# Patient Record
Sex: MALE | Race: BLACK OR AFRICAN AMERICAN | NOT HISPANIC OR LATINO | Employment: OTHER | URBAN - METROPOLITAN AREA
[De-identification: names, ages, dates, MRNs, and addresses within clinical notes are randomized per-mention and may not be internally consistent; named-entity substitution may affect disease eponyms.]

---

## 2023-09-13 PROBLEM — G89.29 OTHER CHRONIC PAIN: Status: ACTIVE | Noted: 2023-09-13

## 2023-09-13 PROBLEM — I10 HTN (HYPERTENSION): Status: ACTIVE | Noted: 2023-09-13

## 2023-09-13 PROBLEM — E83.52 SERUM CALCIUM ELEVATED: Status: ACTIVE | Noted: 2023-09-13

## 2023-09-13 PROBLEM — G47.00 INSOMNIA: Status: ACTIVE | Noted: 2023-09-13

## 2023-09-13 PROBLEM — N40.1 BENIGN PROSTATIC HYPERPLASIA WITH LOWER URINARY TRACT SYMPTOMS: Status: ACTIVE | Noted: 2023-09-13

## 2023-09-13 PROBLEM — M75.102 ROTATOR CUFF SYNDROME OF LEFT SHOULDER: Status: ACTIVE | Noted: 2023-09-13

## 2023-09-13 PROBLEM — M54.41 LUMBAGO WITH SCIATICA, RIGHT SIDE: Status: ACTIVE | Noted: 2023-09-13

## 2023-09-13 RX ORDER — NAPROXEN 500 MG/1
500 TABLET ORAL EVERY 12 HOURS PRN
COMMUNITY
End: 2024-04-04 | Stop reason: SDUPTHER

## 2023-09-13 RX ORDER — IBUPROFEN 800 MG/1
800 TABLET ORAL 3 TIMES DAILY PRN
COMMUNITY

## 2023-09-13 RX ORDER — TAMSULOSIN HYDROCHLORIDE 0.4 MG/1
0.4 CAPSULE ORAL DAILY
COMMUNITY

## 2023-09-13 RX ORDER — ESZOPICLONE 3 MG/1
3 TABLET, FILM COATED ORAL NIGHTLY
COMMUNITY
Start: 2023-01-05 | End: 2024-02-07 | Stop reason: SDUPTHER

## 2023-09-13 RX ORDER — AMLODIPINE BESYLATE 5 MG/1
5 TABLET ORAL DAILY
COMMUNITY
End: 2024-03-18 | Stop reason: SDUPTHER

## 2023-09-29 ENCOUNTER — APPOINTMENT (OUTPATIENT)
Dept: PRIMARY CARE | Facility: CLINIC | Age: 63
End: 2023-09-29

## 2023-09-29 ENCOUNTER — HOSPITAL ENCOUNTER (OUTPATIENT)
Dept: DATA CONVERSION | Facility: HOSPITAL | Age: 63
Discharge: HOME | End: 2023-09-29
Payer: COMMERCIAL

## 2023-09-29 LAB
AMPHETAMINES UR QL SCN>1000 NG/ML: NEGATIVE
BARBITURATES UR QL SCN>300 NG/ML: NEGATIVE
BENZODIAZ UR QL SCN>300 NG/ML: NEGATIVE
BZE UR QL SCN>300 NG/ML: NEGATIVE
CANNABINOIDS UR QL SCN>50 NG/ML: NEGATIVE
DRUG SCREEN COMMENT UR-IMP: NORMAL
FENTANYL+NORFENTANYL UR QL SCN: NEGATIVE
METHADONE UR QL SCN>300 NG/ML: NEGATIVE
OPIATES UR QL SCN>300 NG/ML: NEGATIVE
OXYCODONE UR QL: NEGATIVE
PCP UR QL SCN>25 NG/ML: NEGATIVE

## 2024-02-07 DIAGNOSIS — G47.00 INSOMNIA, UNSPECIFIED TYPE: Primary | ICD-10-CM

## 2024-02-07 RX ORDER — ESZOPICLONE 3 MG/1
3 TABLET, FILM COATED ORAL NIGHTLY
Qty: 90 TABLET | Refills: 0 | Status: SHIPPED | OUTPATIENT
Start: 2024-02-07 | End: 2024-04-04 | Stop reason: SDUPTHER

## 2024-03-18 DIAGNOSIS — I10 PRIMARY HYPERTENSION: Primary | ICD-10-CM

## 2024-03-18 RX ORDER — AMLODIPINE BESYLATE 5 MG/1
5 TABLET ORAL DAILY
Qty: 90 TABLET | Refills: 0 | Status: SHIPPED | OUTPATIENT
Start: 2024-03-18

## 2024-04-01 ENCOUNTER — LAB (OUTPATIENT)
Dept: LAB | Facility: LAB | Age: 64
End: 2024-04-01
Payer: COMMERCIAL

## 2024-04-01 DIAGNOSIS — Z13.6 ENCOUNTER FOR SCREENING FOR CARDIOVASCULAR DISORDERS: ICD-10-CM

## 2024-04-01 DIAGNOSIS — Z12.5 ENCOUNTER FOR SCREENING FOR MALIGNANT NEOPLASM OF PROSTATE: ICD-10-CM

## 2024-04-01 DIAGNOSIS — I10 ESSENTIAL (PRIMARY) HYPERTENSION: ICD-10-CM

## 2024-04-01 DIAGNOSIS — Z00.00 ENCOUNTER FOR GENERAL ADULT MEDICAL EXAMINATION WITHOUT ABNORMAL FINDINGS: Primary | ICD-10-CM

## 2024-04-01 LAB
ALBUMIN SERPL BCP-MCNC: 4.2 G/DL (ref 3.4–5)
ALP SERPL-CCNC: 83 U/L (ref 33–136)
ALT SERPL W P-5'-P-CCNC: 16 U/L (ref 10–52)
ANION GAP SERPL CALC-SCNC: 12 MMOL/L (ref 10–20)
AST SERPL W P-5'-P-CCNC: 21 U/L (ref 9–39)
BASOPHILS # BLD AUTO: 0.04 X10*3/UL (ref 0–0.1)
BASOPHILS NFR BLD AUTO: 0.7 %
BILIRUB SERPL-MCNC: 0.4 MG/DL (ref 0–1.2)
BUN SERPL-MCNC: 14 MG/DL (ref 6–23)
CALCIUM SERPL-MCNC: 9.5 MG/DL (ref 8.6–10.6)
CHLORIDE SERPL-SCNC: 106 MMOL/L (ref 98–107)
CHOLEST SERPL-MCNC: 186 MG/DL (ref 0–199)
CHOLESTEROL/HDL RATIO: 4.3
CO2 SERPL-SCNC: 30 MMOL/L (ref 21–32)
CREAT SERPL-MCNC: 1.21 MG/DL (ref 0.5–1.3)
EGFRCR SERPLBLD CKD-EPI 2021: 67 ML/MIN/1.73M*2
EOSINOPHIL # BLD AUTO: 0.13 X10*3/UL (ref 0–0.7)
EOSINOPHIL NFR BLD AUTO: 2.3 %
ERYTHROCYTE [DISTWIDTH] IN BLOOD BY AUTOMATED COUNT: 14.3 % (ref 11.5–14.5)
GLUCOSE SERPL-MCNC: 86 MG/DL (ref 74–99)
HCT VFR BLD AUTO: 47.1 % (ref 41–52)
HDLC SERPL-MCNC: 43.7 MG/DL
HGB BLD-MCNC: 14.6 G/DL (ref 13.5–17.5)
IMM GRANULOCYTES # BLD AUTO: 0.01 X10*3/UL (ref 0–0.7)
IMM GRANULOCYTES NFR BLD AUTO: 0.2 % (ref 0–0.9)
LDLC SERPL CALC-MCNC: 124 MG/DL
LYMPHOCYTES # BLD AUTO: 1.64 X10*3/UL (ref 1.2–4.8)
LYMPHOCYTES NFR BLD AUTO: 28.6 %
MCH RBC QN AUTO: 27.8 PG (ref 26–34)
MCHC RBC AUTO-ENTMCNC: 31 G/DL (ref 32–36)
MCV RBC AUTO: 90 FL (ref 80–100)
MONOCYTES # BLD AUTO: 0.42 X10*3/UL (ref 0.1–1)
MONOCYTES NFR BLD AUTO: 7.3 %
NEUTROPHILS # BLD AUTO: 3.5 X10*3/UL (ref 1.2–7.7)
NEUTROPHILS NFR BLD AUTO: 60.9 %
NON HDL CHOLESTEROL: 142 MG/DL (ref 0–149)
NRBC BLD-RTO: 0 /100 WBCS (ref 0–0)
PLATELET # BLD AUTO: 294 X10*3/UL (ref 150–450)
POTASSIUM SERPL-SCNC: 4.5 MMOL/L (ref 3.5–5.3)
PROT SERPL-MCNC: 7 G/DL (ref 6.4–8.2)
PSA SERPL-MCNC: 6.78 NG/ML
RBC # BLD AUTO: 5.26 X10*6/UL (ref 4.5–5.9)
SODIUM SERPL-SCNC: 143 MMOL/L (ref 136–145)
TRIGL SERPL-MCNC: 91 MG/DL (ref 0–149)
VLDL: 18 MG/DL (ref 0–40)
WBC # BLD AUTO: 5.7 X10*3/UL (ref 4.4–11.3)

## 2024-04-01 PROCEDURE — 80061 LIPID PANEL: CPT

## 2024-04-01 PROCEDURE — 36415 COLL VENOUS BLD VENIPUNCTURE: CPT

## 2024-04-01 PROCEDURE — 80053 COMPREHEN METABOLIC PANEL: CPT

## 2024-04-01 PROCEDURE — 85025 COMPLETE CBC W/AUTO DIFF WBC: CPT

## 2024-04-01 PROCEDURE — 84153 ASSAY OF PSA TOTAL: CPT

## 2024-04-04 ENCOUNTER — OFFICE VISIT (OUTPATIENT)
Dept: PRIMARY CARE | Facility: CLINIC | Age: 64
End: 2024-04-04
Payer: COMMERCIAL

## 2024-04-04 VITALS
BODY MASS INDEX: 32.44 KG/M2 | DIASTOLIC BLOOD PRESSURE: 84 MMHG | TEMPERATURE: 98 F | HEART RATE: 84 BPM | WEIGHT: 219 LBS | SYSTOLIC BLOOD PRESSURE: 122 MMHG | OXYGEN SATURATION: 95 % | HEIGHT: 69 IN

## 2024-04-04 DIAGNOSIS — Z12.11 SCREENING FOR COLON CANCER: ICD-10-CM

## 2024-04-04 DIAGNOSIS — G47.00 INSOMNIA, UNSPECIFIED TYPE: ICD-10-CM

## 2024-04-04 DIAGNOSIS — M54.16 LUMBAR RADICULOPATHY: Primary | ICD-10-CM

## 2024-04-04 PROCEDURE — 1036F TOBACCO NON-USER: CPT | Performed by: INTERNAL MEDICINE

## 2024-04-04 PROCEDURE — 3079F DIAST BP 80-89 MM HG: CPT | Performed by: INTERNAL MEDICINE

## 2024-04-04 PROCEDURE — 3074F SYST BP LT 130 MM HG: CPT | Performed by: INTERNAL MEDICINE

## 2024-04-04 PROCEDURE — 99214 OFFICE O/P EST MOD 30 MIN: CPT | Performed by: INTERNAL MEDICINE

## 2024-04-04 RX ORDER — NAPROXEN 500 MG/1
500 TABLET ORAL EVERY 12 HOURS PRN
Qty: 90 TABLET | Refills: 1 | Status: SHIPPED | OUTPATIENT
Start: 2024-04-04

## 2024-04-04 RX ORDER — ESZOPICLONE 3 MG/1
3 TABLET, FILM COATED ORAL NIGHTLY
Qty: 90 TABLET | Refills: 0 | Status: SHIPPED | OUTPATIENT
Start: 2024-04-04 | End: 2024-07-03

## 2024-04-04 ASSESSMENT — ENCOUNTER SYMPTOMS
DEPRESSION: 0
COUGH: 0
SHORTNESS OF BREATH: 0
UNEXPECTED WEIGHT CHANGE: 0
BACK PAIN: 1
FATIGUE: 0
ABDOMINAL PAIN: 0
HEMATURIA: 0
TREMORS: 0
LOSS OF SENSATION IN FEET: 0
NUMBNESS: 0
JOINT SWELLING: 0
ARTHRALGIAS: 0
WHEEZING: 0
PALPITATIONS: 0
NERVOUS/ANXIOUS: 0
CONSTIPATION: 0
POLYPHAGIA: 0
SINUS PAIN: 0
OCCASIONAL FEELINGS OF UNSTEADINESS: 0
SORE THROAT: 0
BLOOD IN STOOL: 0
WEAKNESS: 1
POLYDIPSIA: 0

## 2024-04-04 ASSESSMENT — PAIN SCALES - GENERAL: PAINLEVEL: 0-NO PAIN

## 2024-04-04 ASSESSMENT — PATIENT HEALTH QUESTIONNAIRE - PHQ9
1. LITTLE INTEREST OR PLEASURE IN DOING THINGS: NOT AT ALL
2. FEELING DOWN, DEPRESSED OR HOPELESS: NOT AT ALL
SUM OF ALL RESPONSES TO PHQ9 QUESTIONS 1 AND 2: 0

## 2024-04-04 ASSESSMENT — COLUMBIA-SUICIDE SEVERITY RATING SCALE - C-SSRS
6. HAVE YOU EVER DONE ANYTHING, STARTED TO DO ANYTHING, OR PREPARED TO DO ANYTHING TO END YOUR LIFE?: NO
1. IN THE PAST MONTH, HAVE YOU WISHED YOU WERE DEAD OR WISHED YOU COULD GO TO SLEEP AND NOT WAKE UP?: NO
2. HAVE YOU ACTUALLY HAD ANY THOUGHTS OF KILLING YOURSELF?: NO

## 2024-04-04 NOTE — PROGRESS NOTES
Subjective   Patient ID: Abiel Sorensen is a 63 y.o. male who presents for Follow-up (Sciatica pain over right buttocks causing right leg weakness 3 months).    HPI          Has history of hypertension, BPH, insomnia.          Last colonoscopy was in 2014 March by Dr Luna.         H/O BPH- sees Dr Moon.         H/O Hypertension, compliant with medications.     Complaining of right gluteal pain radiating down right leg, with right leg weakness since last 3 months. He is doing exercises at home, which helped a little. Taking Naproxen helps.  Had MRI of lumbar spine in 2020 which showed      Grade 1 anterolisthesis of the L5 5 in relation to the L4 and S1  vertebra. Degenerative changes and disc disease. Left paracentral disc  herniation at the L3-L4 level, contacting the traversing left L4 nerve root  within the lateral recess. Bilateral L5 foraminal stenosis, with impingement  of the exiting L5 nerve roots bilaterally.     He saw [facility] for few months, which helped his symptoms several years ago      Review of Systems   Constitutional:  Negative for fatigue and unexpected weight change.   HENT:  Negative for congestion, ear pain, sinus pain and sore throat.    Respiratory:  Negative for cough, shortness of breath and wheezing.    Cardiovascular:  Negative for chest pain, palpitations and leg swelling.   Gastrointestinal:  Negative for abdominal pain, blood in stool and constipation.   Endocrine: Negative for polydipsia, polyphagia and polyuria.   Genitourinary:  Negative for hematuria and urgency.   Musculoskeletal:  Positive for back pain. Negative for arthralgias and joint swelling.   Skin:  Negative for rash.   Neurological:  Positive for weakness (right leg weakness). Negative for tremors, syncope and numbness.   Psychiatric/Behavioral:  Negative for behavioral problems. The patient is not nervous/anxious.        Objective   /84 (BP Location: Left arm, Patient Position: Sitting, BP Cuff  "Size: Large adult)   Pulse 84   Temp 36.7 °C (98 °F) (Temporal)   Ht 1.753 m (5' 9\")   Wt 99.3 kg (219 lb)   SpO2 95%   BMI 32.34 kg/m²     Physical Exam  Constitutional:       General: He is not in acute distress.  HENT:      Head: Normocephalic and atraumatic.   Eyes:      Extraocular Movements: Extraocular movements intact.      Conjunctiva/sclera: Conjunctivae normal.      Pupils: Pupils are equal, round, and reactive to light.   Cardiovascular:      Rate and Rhythm: Normal rate and regular rhythm.      Pulses: Normal pulses.      Heart sounds: No murmur heard.  Pulmonary:      Effort: Pulmonary effort is normal.      Breath sounds: Normal breath sounds. No wheezing or rales.   Abdominal:      General: Bowel sounds are normal.      Palpations: Abdomen is soft. There is no mass.      Tenderness: There is no abdominal tenderness. There is no guarding.   Musculoskeletal:         General: Normal range of motion.      Right lower leg: No edema.      Left lower leg: No edema.      Comments: Right perilumbar tenderness on palpation, SLR negative, strength in bilateral lower extremities 5 x 5   Skin:     General: Skin is warm and dry.      Findings: No lesion.   Neurological:      General: No focal deficit present.      Mental Status: He is alert and oriented to person, place, and time.      Cranial Nerves: No cranial nerve deficit.      Gait: Gait normal.   Psychiatric:         Mood and Affect: Mood normal.         Assessment/Plan       Abiel was seen today for follow-up.  Diagnoses and all orders for this visit:  Lumbar radiculopathy (Primary)  -     naproxen (Naprosyn) 500 mg tablet; Take 1 tablet (500 mg) by mouth every 12 hours if needed for mild pain (1 - 3). With food or milk  -     Referral to Pain Medicine; Future  -     MR lumbar spine wo IV contrast; Future  Insomnia, unspecified type  -     eszopiclone (Lunesta) 3 mg tablet; Take 1 tablet (3 mg) by mouth once daily at bedtime. immediately before " bedtime  Screening for colon cancer  -     Referral to Gastroenterology; Future     Reviewed recent labs with patient  PSA elevated, has been following with urology  Advised to take naproxen with food  Ordered MRI lumbar spine     Lab Results   Component Value Date    GLUCOSE 86 04/01/2024    CALCIUM 9.5 04/01/2024     04/01/2024    K 4.5 04/01/2024    CO2 30 04/01/2024     04/01/2024    BUN 14 04/01/2024    CREATININE 1.21 04/01/2024        Lab Results   Component Value Date    WBC 5.7 04/01/2024    HGB 14.6 04/01/2024    HCT 47.1 04/01/2024    MCV 90 04/01/2024     04/01/2024    .  Lab Results   Component Value Date    PSA 3.8 05/10/2023    PSA 8.9 (H) 10/06/2022    PSA 4.7 (H) 02/08/2021        Current Outpatient Medications   Medication Instructions    amLODIPine (NORVASC) 5 mg, oral, Daily    eszopiclone (LUNESTA) 3 mg, oral, Nightly, immediately before bedtime    ibuprofen 800 mg, oral, 3 times daily PRN    naproxen (NAPROSYN) 500 mg, oral, Every 12 hours PRN, With food or milk    tamsulosin (FLOMAX) 0.4 mg, oral, Daily

## 2024-05-07 ENCOUNTER — APPOINTMENT (OUTPATIENT)
Dept: PAIN MEDICINE | Facility: CLINIC | Age: 64
End: 2024-05-07
Payer: COMMERCIAL

## 2024-05-10 ENCOUNTER — OFFICE VISIT (OUTPATIENT)
Dept: PAIN MEDICINE | Facility: CLINIC | Age: 64
End: 2024-05-10
Payer: COMMERCIAL

## 2024-05-10 VITALS
HEIGHT: 69 IN | HEART RATE: 72 BPM | RESPIRATION RATE: 18 BRPM | DIASTOLIC BLOOD PRESSURE: 79 MMHG | BODY MASS INDEX: 32.44 KG/M2 | WEIGHT: 219 LBS | SYSTOLIC BLOOD PRESSURE: 113 MMHG

## 2024-05-10 DIAGNOSIS — M79.18 DIFFUSE MYOFASCIAL PAIN SYNDROME: ICD-10-CM

## 2024-05-10 DIAGNOSIS — G89.29 CHRONIC LOW BACK PAIN WITH RIGHT-SIDED SCIATICA, UNSPECIFIED BACK PAIN LATERALITY: ICD-10-CM

## 2024-05-10 DIAGNOSIS — M54.41 CHRONIC LOW BACK PAIN WITH RIGHT-SIDED SCIATICA, UNSPECIFIED BACK PAIN LATERALITY: ICD-10-CM

## 2024-05-10 DIAGNOSIS — M54.16 LUMBAR RADICULOPATHY: Primary | ICD-10-CM

## 2024-05-10 PROCEDURE — 99204 OFFICE O/P NEW MOD 45 MIN: CPT | Performed by: ANESTHESIOLOGY

## 2024-05-10 PROCEDURE — 3074F SYST BP LT 130 MM HG: CPT | Performed by: ANESTHESIOLOGY

## 2024-05-10 PROCEDURE — 3078F DIAST BP <80 MM HG: CPT | Performed by: ANESTHESIOLOGY

## 2024-05-10 PROCEDURE — 99214 OFFICE O/P EST MOD 30 MIN: CPT | Performed by: ANESTHESIOLOGY

## 2024-05-10 RX ORDER — TIZANIDINE 2 MG/1
2 TABLET ORAL 3 TIMES DAILY PRN
Qty: 90 TABLET | Refills: 0 | Status: SHIPPED | OUTPATIENT
Start: 2024-05-10 | End: 2024-06-06

## 2024-05-10 RX ORDER — METHYLPREDNISOLONE 4 MG/1
TABLET ORAL
Qty: 21 TABLET | Refills: 0 | Status: SHIPPED | OUTPATIENT
Start: 2024-05-10 | End: 2024-05-17

## 2024-05-10 ASSESSMENT — ENCOUNTER SYMPTOMS
PSYCHIATRIC NEGATIVE: 1
RESPIRATORY NEGATIVE: 1
HEMATOLOGIC/LYMPHATIC NEGATIVE: 1
BACK PAIN: 1
ENDOCRINE NEGATIVE: 1
NEUROLOGICAL NEGATIVE: 1
CONSTITUTIONAL NEGATIVE: 1
EYES NEGATIVE: 1
CARDIOVASCULAR NEGATIVE: 1
GASTROINTESTINAL NEGATIVE: 1

## 2024-05-10 ASSESSMENT — PATIENT HEALTH QUESTIONNAIRE - PHQ9
SUM OF ALL RESPONSES TO PHQ9 QUESTIONS 1 AND 2: 2
SUM OF ALL RESPONSES TO PHQ9 QUESTIONS 1 AND 2: 0
2. FEELING DOWN, DEPRESSED OR HOPELESS: NOT AT ALL
1. LITTLE INTEREST OR PLEASURE IN DOING THINGS: SEVERAL DAYS
10. IF YOU CHECKED OFF ANY PROBLEMS, HOW DIFFICULT HAVE THESE PROBLEMS MADE IT FOR YOU TO DO YOUR WORK, TAKE CARE OF THINGS AT HOME, OR GET ALONG WITH OTHER PEOPLE: SOMEWHAT DIFFICULT
1. LITTLE INTEREST OR PLEASURE IN DOING THINGS: NOT AT ALL
2. FEELING DOWN, DEPRESSED OR HOPELESS: SEVERAL DAYS

## 2024-05-10 ASSESSMENT — PAIN DESCRIPTION - DESCRIPTORS: DESCRIPTORS: ACHING;NUMBNESS;BURNING

## 2024-05-10 ASSESSMENT — PAIN - FUNCTIONAL ASSESSMENT: PAIN_FUNCTIONAL_ASSESSMENT: 0-10

## 2024-05-10 ASSESSMENT — PAIN SCALES - GENERAL
PAINLEVEL: 3
PAINLEVEL_OUTOF10: 3

## 2024-05-10 ASSESSMENT — LIFESTYLE VARIABLES: TOTAL SCORE: 0

## 2024-05-10 NOTE — LETTER
May 13, 2024     Val Knutson MD  [id number]  [facility], [address]  [facility] [address]    Patient: Abiel Sorensen   YOB: 1960   Date of Visit: 5/10/2024       Dear Dr. Val Knutson MD:    Thank you for referring Abiel Sorensen to me for evaluation. Below are my notes for this consultation.  If you have questions, please do not hesitate to call me. I look forward to following your patient along with you.       Sincerely,     Vik Eugene MD      CC: No Recipients  ______________________________________________________________________________________    PAIN MANAGEMENT NEW PATIENT OFFICE NOTE    Date of Service: 5/10/2024    SUBJECTIVE    CHIEF COMPLAINT: LBP    HISTORY OF PRESENT ILLNESS    Abiel Sorensen is a 63 y.o. male with PMH HTN, obesity who presents as new patient referred by Val Knutson MD with LB pain.    Pt describes ~15 y hx LBP. Pain significantly worsened in last 6 mo without inciting trauma/incident. LBP radiates down RLE and is assoc with R foot numbness, shira in big toe and lateral foot. RLE weaker compared to left. Claims pain is sporadic and troubles him with sitting, standing, walking, and even at night when trying to sleep. Pt has tried Tylenol, NSAIDs, >6 home exercise tx (has done formal PT in past).     Pt denies new-onset bowel/bladder incontinence.  Pt denies recent infection, allergy to Latex/iodine/contrast. Patient is currently taking the following blood thinner(s): N/A    REVIEW OF SYSTEMS  Review of Systems   Constitutional: Negative.    HENT: Negative.     Eyes: Negative.    Respiratory: Negative.     Cardiovascular: Negative.    Gastrointestinal: Negative.    Endocrine: Negative.    Musculoskeletal:  Positive for back pain.   Skin: Negative.    Neurological: Negative.    Hematological: Negative.    Psychiatric/Behavioral: Negative.         PAST MEDICAL HISTORY  History reviewed. No pertinent past medical history.  History  "reviewed. No pertinent surgical history.  Family History   Problem Relation Name Age of Onset   • Mental illness Mother  86   • Dementia Mother     • Stroke Father         CURRENT MEDICATIONS  Current Outpatient Medications   Medication Sig Dispense Refill   • amLODIPine (Norvasc) 5 mg tablet Take 1 tablet (5 mg) by mouth once daily. 90 tablet 0   • eszopiclone (Lunesta) 3 mg tablet Take 1 tablet (3 mg) by mouth once daily at bedtime. immediately before bedtime 90 tablet 0   • ibuprofen 800 mg tablet Take 1 tablet (800 mg) by mouth 3 times a day as needed.     • naproxen (Naprosyn) 500 mg tablet Take 1 tablet (500 mg) by mouth every 12 hours if needed for mild pain (1 - 3). With food or milk 90 tablet 1   • tamsulosin (Flomax) 0.4 mg 24 hr capsule Take 1 capsule (0.4 mg) by mouth once daily.       No current facility-administered medications for this visit.       ALLERGIES AND DRUG REACTIONS  No Known Allergies       OBJECTIVE  Visit Vitals  /79   Pulse 72   Resp 18   Ht 1.753 m (5' 9\")   Wt 99.3 kg (219 lb)   BMI 32.34 kg/m²   Smoking Status Never   BSA 2.2 m²       Last Recorded Pain Score (if available):                Physical Exam  Vitals and nursing note reviewed.     General: Sitting in chair, NAD  Head: NCAT  Eyes: Sclera/conjunctiva clear, EOMI, PERRL  Nose/mouth: MMM  CV: Good distal pulses  Lungs: Good/equal chest excursion  Abdomen: Soft, ND  Ext: No cyanosis/edema  MSK: L-spine alignment: unremarkable, R paraspinal m TTP, L-spine ROM: lmtd extension/flexion 2/2 pain    Neuro: AAOx3   Dermatome sensation to light touch  LEFT L1 (lower pelvis/upper thigh): WNL    RIGHT L1: WNL      LEFT L2 (upper thigh): WNL       RIGHT: L2:WNL      LEFT L3 (medial knee): WNL       RIGHT L3: WNL      LEFT L4 (superior medial malleolus): WNL       RIGHT L4: WNL      LEFT L5 (dorsal foot): WNL       RIGHT L5: WNL      LEFT S1 (lateral foot): WNL     RIGHT S1: WNL      LEFT S2 (popliteal fossa): WNL    RIGHT S2: WNL    " "    Motor strength  LEFT L2 (hip flexion): 5/5   RIGHT L2: 5/5  LEFT L3 (knee extension): 5/5     RIGHT L3: 5/5  LEFT L4 (dorsiflexion): 5/5     RIGHT L4: 5/5  LEFT L5 (EHL extension): 5/5     RIGHT L5: 5/5  LEFT S1 (plantarflexion): 5/5     RIGHT S1: 5/5  LEFT S2 (knee flexion): 5/5      RIGHT S2: 5/5    Special testing  DTR unremarkable  Seated slump test neg BL  Clonus: neg BL  Babinski: neg BL    Psych: affect nl  Skin: no rash/lesions      REVIEW OF LABORATORY DATA  I have reviewed the following lab results:  WBC   Date Value Ref Range Status   04/01/2024 5.7 4.4 - 11.3 x10*3/uL Final     RBC   Date Value Ref Range Status   04/01/2024 5.26 4.50 - 5.90 x10*6/uL Final     Hemoglobin   Date Value Ref Range Status   04/01/2024 14.6 13.5 - 17.5 g/dL Final     Hematocrit   Date Value Ref Range Status   04/01/2024 47.1 41.0 - 52.0 % Final     MCV   Date Value Ref Range Status   04/01/2024 90 80 - 100 fL Final     MCH   Date Value Ref Range Status   04/01/2024 27.8 26.0 - 34.0 pg Final     MCHC   Date Value Ref Range Status   04/01/2024 31.0 (L) 32.0 - 36.0 g/dL Final     RDW   Date Value Ref Range Status   04/01/2024 14.3 11.5 - 14.5 % Final     Platelets   Date Value Ref Range Status   04/01/2024 294 150 - 450 x10*3/uL Final     MPV   Date Value Ref Range Status   10/06/2022 9.3 7.0 - 12.6 CU Final     Sodium   Date Value Ref Range Status   04/01/2024 143 136 - 145 mmol/L Final     Potassium   Date Value Ref Range Status   04/01/2024 4.5 3.5 - 5.3 mmol/L Final     Bicarbonate   Date Value Ref Range Status   04/01/2024 30 21 - 32 mmol/L Final     Urea Nitrogen   Date Value Ref Range Status   04/01/2024 14 6 - 23 mg/dL Final     Calcium   Date Value Ref Range Status   04/01/2024 9.5 8.6 - 10.6 mg/dL Final     No results found for: \"PROTIME\", \"PTT\", \"INR\", \"FIBRINOGEN\"      REVIEW OF RADIOLOGY   I have reviewed the following:  Radiology Studies           MRI L-spine 2/22/20:  Grade 1 anterolisthesis of the L5 5 in " relation to the L4 and S1  vertebra. Degenerative changes and disc disease. Left paracentral disc  herniation at the L3-L4 level, contacting the traversing left L4 nerve root  within the lateral recess. Bilateral L5 foraminal stenosis, with impingement  of the exiting L5 nerve roots bilaterally. See above for details.       ASSESSMENT & PLAN  Abiel Sorensen is a 63 y.o. old male with PMH HTN, obesity who presents as new patient referred by Val Knutson MD with LBP    1) LBP  ->15 y LBP worsening in last >6 mo with radiating down RLE assoc with R foot numbness  -Refractive to yrs of conservative tx including Tylenol, NSAIDs, >6 home exercise tx (has done formal PT in past)  -MRI L-spine pending patient to schedule. Will follow-up  -Submit PA for TPI  -F/U 2 w to discuss MRI results and next steps  -In meantime, MPD, tizanidine PRN        Discussed procedure risks/benefits in detail with patient. Pt meets medical necessity for procedure due to failure of conservative measures. Reviewed procedural risks including bleeding, infection, nerve damage, paralysis. Also reviewed mitigating factors such as screening for infection/blood thinner use, sterile precautions, and image-guidance when applicable. All questions answered. Pt/guardian expressed understanding and choose to proceed           Vik Eugene MD  Anesthesiologist & Interventional Pain Physician   [facility] [place]  O: [phone]  F: [phone]  11:53 AM  05/10/24

## 2024-05-10 NOTE — PROGRESS NOTES
PAIN MANAGEMENT NEW PATIENT OFFICE NOTE    Date of Service: 5/10/2024    SUBJECTIVE    CHIEF COMPLAINT: LBP    HISTORY OF PRESENT ILLNESS    Abiel Sorensen is a 63 y.o. male with PMH HTN, obesity who presents as new patient referred by Val Knutson MD with LB pain.    Pt describes ~15 y hx LBP. Pain significantly worsened in last 6 mo without inciting trauma/incident. LBP radiates down RLE and is assoc with R foot numbness, shira in big toe and lateral foot. RLE weaker compared to left. Claims pain is sporadic and troubles him with sitting, standing, walking, and even at night when trying to sleep. Pt has tried Tylenol, NSAIDs, >6 home exercise tx (has done formal PT in past).     Pt denies new-onset bowel/bladder incontinence.  Pt denies recent infection, allergy to Latex/iodine/contrast. Patient is currently taking the following blood thinner(s): N/A    REVIEW OF SYSTEMS  Review of Systems   Constitutional: Negative.    HENT: Negative.     Eyes: Negative.    Respiratory: Negative.     Cardiovascular: Negative.    Gastrointestinal: Negative.    Endocrine: Negative.    Musculoskeletal:  Positive for back pain.   Skin: Negative.    Neurological: Negative.    Hematological: Negative.    Psychiatric/Behavioral: Negative.         PAST MEDICAL HISTORY  History reviewed. No pertinent past medical history.  History reviewed. No pertinent surgical history.  Family History   Problem Relation Name Age of Onset    Mental illness Mother  86    Dementia Mother      Stroke Father         CURRENT MEDICATIONS  Current Outpatient Medications   Medication Sig Dispense Refill    amLODIPine (Norvasc) 5 mg tablet Take 1 tablet (5 mg) by mouth once daily. 90 tablet 0    eszopiclone (Lunesta) 3 mg tablet Take 1 tablet (3 mg) by mouth once daily at bedtime. immediately before bedtime 90 tablet 0    ibuprofen 800 mg tablet Take 1 tablet (800 mg) by mouth 3 times a day as needed.      naproxen (Naprosyn) 500 mg tablet Take 1 tablet (500  "mg) by mouth every 12 hours if needed for mild pain (1 - 3). With food or milk 90 tablet 1    tamsulosin (Flomax) 0.4 mg 24 hr capsule Take 1 capsule (0.4 mg) by mouth once daily.       No current facility-administered medications for this visit.       ALLERGIES AND DRUG REACTIONS  No Known Allergies       OBJECTIVE  Visit Vitals  /79   Pulse 72   Resp 18   Ht 1.753 m (5' 9\")   Wt 99.3 kg (219 lb)   BMI 32.34 kg/m²   Smoking Status Never   BSA 2.2 m²       Last Recorded Pain Score (if available):                Physical Exam  Vitals and nursing note reviewed.     General: Sitting in chair, NAD  Head: NCAT  Eyes: Sclera/conjunctiva clear, EOMI, PERRL  Nose/mouth: MMM  CV: Good distal pulses  Lungs: Good/equal chest excursion  Abdomen: Soft, ND  Ext: No cyanosis/edema  MSK: L-spine alignment: unremarkable, R paraspinal m TTP, L-spine ROM: lmtd extension/flexion 2/2 pain    Neuro: AAOx3   Dermatome sensation to light touch  LEFT L1 (lower pelvis/upper thigh): WNL    RIGHT L1: WNL      LEFT L2 (upper thigh): WNL       RIGHT: L2:WNL      LEFT L3 (medial knee): WNL       RIGHT L3: WNL      LEFT L4 (superior medial malleolus): WNL       RIGHT L4: WNL      LEFT L5 (dorsal foot): WNL       RIGHT L5: WNL      LEFT S1 (lateral foot): WNL     RIGHT S1: WNL      LEFT S2 (popliteal fossa): WNL    RIGHT S2: WNL        Motor strength  LEFT L2 (hip flexion): 5/5   RIGHT L2: 5/5  LEFT L3 (knee extension): 5/5     RIGHT L3: 5/5  LEFT L4 (dorsiflexion): 5/5     RIGHT L4: 5/5  LEFT L5 (EHL extension): 5/5     RIGHT L5: 5/5  LEFT S1 (plantarflexion): 5/5     RIGHT S1: 5/5  LEFT S2 (knee flexion): 5/5      RIGHT S2: 5/5    Special testing  DTR unremarkable  Seated slump test neg BL  Clonus: neg BL  Babinski: neg BL    Psych: affect nl  Skin: no rash/lesions      REVIEW OF LABORATORY DATA  I have reviewed the following lab results:  WBC   Date Value Ref Range Status   04/01/2024 5.7 4.4 - 11.3 x10*3/uL Final     RBC   Date Value Ref " "Range Status   04/01/2024 5.26 4.50 - 5.90 x10*6/uL Final     Hemoglobin   Date Value Ref Range Status   04/01/2024 14.6 13.5 - 17.5 g/dL Final     Hematocrit   Date Value Ref Range Status   04/01/2024 47.1 41.0 - 52.0 % Final     MCV   Date Value Ref Range Status   04/01/2024 90 80 - 100 fL Final     MCH   Date Value Ref Range Status   04/01/2024 27.8 26.0 - 34.0 pg Final     MCHC   Date Value Ref Range Status   04/01/2024 31.0 (L) 32.0 - 36.0 g/dL Final     RDW   Date Value Ref Range Status   04/01/2024 14.3 11.5 - 14.5 % Final     Platelets   Date Value Ref Range Status   04/01/2024 294 150 - 450 x10*3/uL Final     MPV   Date Value Ref Range Status   10/06/2022 9.3 7.0 - 12.6 CU Final     Sodium   Date Value Ref Range Status   04/01/2024 143 136 - 145 mmol/L Final     Potassium   Date Value Ref Range Status   04/01/2024 4.5 3.5 - 5.3 mmol/L Final     Bicarbonate   Date Value Ref Range Status   04/01/2024 30 21 - 32 mmol/L Final     Urea Nitrogen   Date Value Ref Range Status   04/01/2024 14 6 - 23 mg/dL Final     Calcium   Date Value Ref Range Status   04/01/2024 9.5 8.6 - 10.6 mg/dL Final     No results found for: \"PROTIME\", \"PTT\", \"INR\", \"FIBRINOGEN\"      REVIEW OF RADIOLOGY   I have reviewed the following:  Radiology Studies           MRI L-spine 2/22/20:  Grade 1 anterolisthesis of the L5 5 in relation to the L4 and S1  vertebra. Degenerative changes and disc disease. Left paracentral disc  herniation at the L3-L4 level, contacting the traversing left L4 nerve root  within the lateral recess. Bilateral L5 foraminal stenosis, with impingement  of the exiting L5 nerve roots bilaterally. See above for details.       ASSESSMENT & PLAN  Abiel Sorensen is a 63 y.o. old male with PMH HTN, obesity who presents as new patient referred by Val Knutson MD with LBP    1) LBP  ->15 y LBP worsening in last >6 mo with radiating down RLE assoc with R foot numbness  -Refractive to yrs of conservative tx including " Tylenol, NSAIDs, >6 home exercise tx (has done formal PT in past)  -MRI L-spine pending patient to schedule. Will follow-up  -Submit PA for TPI  -F/U 2 w to discuss MRI results and next steps  -In meantime, MPD, tizanidine PRN        Discussed procedure risks/benefits in detail with patient. Pt meets medical necessity for procedure due to failure of conservative measures. Reviewed procedural risks including bleeding, infection, nerve damage, paralysis. Also reviewed mitigating factors such as screening for infection/blood thinner use, sterile precautions, and image-guidance when applicable. All questions answered. Pt/guardian expressed understanding and choose to proceed           Vik Eugene MD  Anesthesiologist & Interventional Pain Physician   [facility] [place]  O: [phone]  F: [phone]  11:53 AM  05/10/24

## 2024-05-13 PROBLEM — M54.16 LUMBAR RADICULOPATHY: Status: ACTIVE | Noted: 2024-05-13

## 2024-05-13 PROBLEM — M79.18 DIFFUSE MYOFASCIAL PAIN SYNDROME: Status: ACTIVE | Noted: 2024-05-13

## 2024-05-21 ENCOUNTER — TELEPHONE (OUTPATIENT)
Dept: PAIN MEDICINE | Facility: CLINIC | Age: 64
End: 2024-05-21
Payer: COMMERCIAL

## 2024-05-29 ENCOUNTER — APPOINTMENT (OUTPATIENT)
Dept: RADIOLOGY | Facility: HOSPITAL | Age: 64
End: 2024-05-29
Payer: COMMERCIAL

## 2024-06-06 DIAGNOSIS — M54.16 LUMBAR RADICULOPATHY: ICD-10-CM

## 2024-06-06 RX ORDER — TIZANIDINE 2 MG/1
2 TABLET ORAL 3 TIMES DAILY PRN
Qty: 270 TABLET | Refills: 1 | Status: SHIPPED | OUTPATIENT
Start: 2024-06-06 | End: 2024-12-03

## 2024-06-21 DIAGNOSIS — I10 PRIMARY HYPERTENSION: ICD-10-CM

## 2024-06-21 RX ORDER — AMLODIPINE BESYLATE 5 MG/1
5 TABLET ORAL DAILY
Qty: 90 TABLET | Refills: 0 | Status: SHIPPED | OUTPATIENT
Start: 2024-06-21

## 2024-07-07 DIAGNOSIS — M54.16 LUMBAR RADICULOPATHY: ICD-10-CM

## 2024-07-08 RX ORDER — NAPROXEN 500 MG/1
TABLET ORAL
Qty: 90 TABLET | Refills: 2 | Status: SHIPPED | OUTPATIENT
Start: 2024-07-08

## 2024-08-29 DIAGNOSIS — N40.1 BENIGN PROSTATIC HYPERPLASIA WITH LOWER URINARY TRACT SYMPTOMS, SYMPTOM DETAILS UNSPECIFIED: Primary | ICD-10-CM

## 2024-08-29 RX ORDER — TAMSULOSIN HYDROCHLORIDE 0.4 MG/1
0.4 CAPSULE ORAL DAILY
Qty: 90 CAPSULE | Refills: 1 | Status: SHIPPED | OUTPATIENT
Start: 2024-08-29

## 2024-10-10 ENCOUNTER — OFFICE VISIT (OUTPATIENT)
Dept: PRIMARY CARE | Facility: CLINIC | Age: 64
End: 2024-10-10
Payer: COMMERCIAL

## 2024-10-10 VITALS
WEIGHT: 213 LBS | DIASTOLIC BLOOD PRESSURE: 88 MMHG | OXYGEN SATURATION: 98 % | TEMPERATURE: 98.2 F | BODY MASS INDEX: 31.45 KG/M2 | HEART RATE: 81 BPM | SYSTOLIC BLOOD PRESSURE: 126 MMHG

## 2024-10-10 DIAGNOSIS — Z00.00 ANNUAL PHYSICAL EXAM: Primary | ICD-10-CM

## 2024-10-10 DIAGNOSIS — G47.00 INSOMNIA, UNSPECIFIED TYPE: ICD-10-CM

## 2024-10-10 DIAGNOSIS — N40.1 BENIGN PROSTATIC HYPERPLASIA WITH LOWER URINARY TRACT SYMPTOMS, SYMPTOM DETAILS UNSPECIFIED: ICD-10-CM

## 2024-10-10 DIAGNOSIS — M54.16 LUMBAR RADICULOPATHY: ICD-10-CM

## 2024-10-10 DIAGNOSIS — I10 PRIMARY HYPERTENSION: ICD-10-CM

## 2024-10-10 PROCEDURE — 3074F SYST BP LT 130 MM HG: CPT | Performed by: INTERNAL MEDICINE

## 2024-10-10 PROCEDURE — 99396 PREV VISIT EST AGE 40-64: CPT | Performed by: INTERNAL MEDICINE

## 2024-10-10 PROCEDURE — 3079F DIAST BP 80-89 MM HG: CPT | Performed by: INTERNAL MEDICINE

## 2024-10-10 PROCEDURE — 1036F TOBACCO NON-USER: CPT | Performed by: INTERNAL MEDICINE

## 2024-10-10 PROCEDURE — 99213 OFFICE O/P EST LOW 20 MIN: CPT | Performed by: INTERNAL MEDICINE

## 2024-10-10 RX ORDER — ESZOPICLONE 3 MG/1
3 TABLET, FILM COATED ORAL NIGHTLY
Qty: 90 TABLET | Refills: 0 | Status: SHIPPED | OUTPATIENT
Start: 2024-10-10 | End: 2025-01-08

## 2024-10-10 ASSESSMENT — ENCOUNTER SYMPTOMS
SHORTNESS OF BREATH: 0
SORE THROAT: 0
UNEXPECTED WEIGHT CHANGE: 0
NUMBNESS: 0
POLYPHAGIA: 0
OCCASIONAL FEELINGS OF UNSTEADINESS: 0
NERVOUS/ANXIOUS: 0
DEPRESSION: 0
FATIGUE: 0
POLYDIPSIA: 0
SINUS PAIN: 0
TREMORS: 0
CONSTIPATION: 0
BLOOD IN STOOL: 0
PALPITATIONS: 0
JOINT SWELLING: 0
WEAKNESS: 1
ABDOMINAL PAIN: 0
BACK PAIN: 1
ARTHRALGIAS: 0
COUGH: 0
WHEEZING: 0
HEMATURIA: 0
LOSS OF SENSATION IN FEET: 0

## 2024-10-10 NOTE — PROGRESS NOTES
Subjective   Patient ID: Abiel Sorensen is a 63 y.o. male who presents for Annual Exam.    HPI          Has history of hypertension, BPH, insomnia.                 H/O BPH and elevated PSA- sees Dr Moon.         H/O Hypertension, compliant with medications.     Complaining of right gluteal pain radiating down right leg, with right leg weakness since last 3 months. He is doing exercises at home, which helped a little. Taking Naproxen helps.  Had MRI of lumbar spine in 2020 which showed      Grade 1 anterolisthesis of the L5 5 in relation to the L4 and S1  vertebra. Degenerative changes and disc disease. Left paracentral disc  herniation at the L3-L4 level, contacting the traversing left L4 nerve root  within the lateral recess. Bilateral L5 foraminal stenosis, with impingement  of the exiting L5 nerve roots bilaterally.     He saw [facility] for few months, which helped his symptoms several years ago    MRI was denied by insurance in May 2024, taking Naproxen and Tizanidine as needed, still no improvement. Will refer to PT, advised MRI  if no improvement in symptoms after PT    Review of Systems   Constitutional:  Negative for fatigue and unexpected weight change.   HENT:  Negative for congestion, ear pain, sinus pain and sore throat.    Respiratory:  Negative for cough, shortness of breath and wheezing.    Cardiovascular:  Negative for chest pain, palpitations and leg swelling.   Gastrointestinal:  Negative for abdominal pain, blood in stool and constipation.   Endocrine: Negative for polydipsia, polyphagia and polyuria.   Genitourinary:  Negative for hematuria and urgency.   Musculoskeletal:  Positive for back pain. Negative for arthralgias and joint swelling.   Skin:  Negative for rash.   Neurological:  Positive for weakness (right leg weakness). Negative for tremors, syncope and numbness.   Psychiatric/Behavioral:  Negative for behavioral problems. The patient is not nervous/anxious.        Objective    /88 (BP Location: Left arm, Patient Position: Sitting, BP Cuff Size: Adult)   Pulse 81   Temp 36.8 °C (98.2 °F) (Temporal)   Wt 96.6 kg (213 lb)   SpO2 98%   BMI 31.45 kg/m²     Physical Exam  Constitutional:       General: He is not in acute distress.  HENT:      Head: Normocephalic and atraumatic.      Right Ear: Tympanic membrane normal.      Left Ear: Tympanic membrane normal.   Eyes:      Extraocular Movements: Extraocular movements intact.      Conjunctiva/sclera: Conjunctivae normal.      Pupils: Pupils are equal, round, and reactive to light.   Cardiovascular:      Rate and Rhythm: Normal rate and regular rhythm.      Pulses: Normal pulses.      Heart sounds: No murmur heard.  Pulmonary:      Effort: Pulmonary effort is normal.      Breath sounds: Normal breath sounds. No wheezing or rales.   Abdominal:      General: Bowel sounds are normal.      Palpations: Abdomen is soft. There is no mass.      Tenderness: There is no abdominal tenderness. There is no guarding.   Musculoskeletal:         General: Normal range of motion.      Right lower leg: No edema.      Left lower leg: No edema.      Comments: Right perilumbar tenderness on palpation, SLR negative, mild reduced strength in right lower extremity   Lymphadenopathy:      Cervical: No cervical adenopathy.   Skin:     General: Skin is warm and dry.      Findings: No lesion.   Neurological:      General: No focal deficit present.      Mental Status: He is alert and oriented to person, place, and time.      Cranial Nerves: No cranial nerve deficit.   Psychiatric:         Mood and Affect: Mood normal.         Assessment/Plan       Abiel was seen today for annual exam.  Diagnoses and all orders for this visit:  Annual physical exam (Primary)  Insomnia, unspecified type  -     eszopiclone (Lunesta) 3 mg tablet; Take 1 tablet (3 mg) by mouth once daily at bedtime. immediately before bedtime  Primary hypertension  Benign prostatic hyperplasia with  lower urinary tract symptoms, symptom details unspecified  Lumbar radiculopathy  -     Referral to Physical Therapy; Future  -     MR lumbar spine wo IV contrast; Future         PSA elevated, has been following with urology  Advised to take naproxen with food  Ordered MRI lumbar spine   Taking prevagen for mental fogginess    Lab Results   Component Value Date    GLUCOSE 86 04/01/2024    CALCIUM 9.5 04/01/2024     04/01/2024    K 4.5 04/01/2024    CO2 30 04/01/2024     04/01/2024    BUN 14 04/01/2024    CREATININE 1.21 04/01/2024        Lab Results   Component Value Date    WBC 5.7 04/01/2024    HGB 14.6 04/01/2024    HCT 47.1 04/01/2024    MCV 90 04/01/2024     04/01/2024    .  Lab Results   Component Value Date    PSA 3.8 05/10/2023    PSA 8.9 (H) 10/06/2022    PSA 4.7 (H) 02/08/2021        Current Outpatient Medications   Medication Instructions    amLODIPine (NORVASC) 5 mg, oral, Daily    eszopiclone (LUNESTA) 3 mg, oral, Nightly, immediately before bedtime    ibuprofen 800 mg, oral, 3 times daily PRN    naproxen (Naprosyn) 500 mg tablet TAKE 1 TABLET (500 MG) BY MOUTH EVERY 12 HOURS IF NEEDED FOR MILD PAIN (1 - 3).WITH FOOD OR MILK    tamsulosin (FLOMAX) 0.4 mg, oral, Daily    tiZANidine (ZANAFLEX) 2 mg, oral, 3 times daily PRN

## 2024-11-15 NOTE — PROGRESS NOTES
Physical Therapy    Physical Therapy Evaluation and Treatment      Patient Name: Abiel Sorensen  MRN: [id number]  Today's Date: 11/15/2024    Time Entry:                             Assessment:      Patient presents with clinical signs and symptoms consistent with lumbar strain / joint mobility restriction / disc derangement / spinal stenosis / post operative discectomy / fusion  with / withoutLE radicular symptoms in the L dermatomal distribution.  These impairments affect ADLs, work, recreational, exercise, transfer, ambulation, lift/carry, and sleep function that requires skilled PT intervention to resolve and enable patient to return to previous level of function. Factors that may affect progress in PT are chronic pain, obesity, medical co-morbidities,  work task strain, cognitive function, depression, and patient compliance. Patient is a flexion/stabilization,   extension/stabilization,  neutral stabilization program candidate.  Initial treatment of    Plan:       Current Problem:   1. Lumbar radiculopathy        2. Diffuse myofascial pain syndrome            Subjective    :     Pain:     Home Living:     Prior Level of Function:       Objective   Cognition:       Observation:  Brace    Hip joint clearance: PROM  Flexion  R  L ,  IR   R  L   ,   ER  R   L  ,  Extension  R    L    Single leg stance:  Eyes open  R  sec   L  sec    Lumbar AROM in standing:    Flex   %  fingers to  , Extension    %  ,   Sidebending  R  %  L  %  ,  BB with rotation  Peripheralizes       Centralizes    Myotomal Strength:  L3   R /5   L /5,  L4  R  /5  L  /5, L5 R  /5  L  /5,  S1   R  /5  L  /5    Hip Strength:  Abduction   R  /5  L  /5,   Extension  R   /5    L  /5 ,  Flexion R   /5   L  /5    Reflex: knee jerk   R  L  ,  Achilles  R  L     Sensation: Reduced / hypersensitive  in   xx dermatomal distribution  R  L      Accessory joint mobility: PA glide hypomobile   hypermobile   Pain    Lumbopelvic mobility: Side glide  R   L    restricted   cleared    SI joint: Gaenslen's maneuver   anterior innominate  R  L  posterior innominate  R  L   upslip  R  L downslip  R  L    Palpation: tenderness to  spinous process  iliac crest  PSIS R  L   ASIS R  L   pubic bones lateral sacral border  R    L    + trigger points    Gait: antalgic  Wbing  device    Special Tests:   squat strategy    knee dominant    hip hinge    Outcome Measures:      Treatments:      EDUCATION:     extensive education regarding mechanism of injury, relevant functional anatomy, treatment program rational, self management, HEP, and POC     Goals:

## 2024-11-18 ENCOUNTER — APPOINTMENT (OUTPATIENT)
Dept: PHYSICAL THERAPY | Facility: CLINIC | Age: 64
End: 2024-11-18
Payer: COMMERCIAL

## 2024-11-18 ENCOUNTER — CLINICAL SUPPORT (OUTPATIENT)
Dept: PHYSICAL THERAPY | Facility: CLINIC | Age: 64
End: 2024-11-18
Payer: COMMERCIAL

## 2024-11-18 DIAGNOSIS — M54.16 LUMBAR RADICULOPATHY: ICD-10-CM

## 2024-11-18 DIAGNOSIS — M79.18 DIFFUSE MYOFASCIAL PAIN SYNDROME: ICD-10-CM

## 2024-11-18 DIAGNOSIS — M54.16 LUMBAR RADICULOPATHY: Primary | ICD-10-CM

## 2024-11-19 NOTE — PROGRESS NOTES
Physical Therapy                 Therapy Communication Note    Patient Name: Abiel Sorensen  MRN: [id number]  Department:   Room: Room/bed info not found  Today's Date: 11/19/2024     Discipline: Physical Therapy    Missed Visit Reason:      Missed Time: Cancel    Comment:

## 2024-12-04 ENCOUNTER — APPOINTMENT (OUTPATIENT)
Dept: PHYSICAL THERAPY | Facility: CLINIC | Age: 64
End: 2024-12-04
Payer: COMMERCIAL

## 2024-12-04 NOTE — PROGRESS NOTES
Physical Therapy    Physical Therapy Evaluation and Treatment      Patient Name: Abiel Sorensen  MRN: [id number]  Today's Date: 12/4/2024    Time Entry:                             Assessment:      Patient presents with clinical signs and symptoms consistent with lumbar strain / joint mobility restriction / disc derangement / spinal stenosis / post operative discectomy / fusion  with / withoutLE radicular symptoms in the L dermatomal distribution.  These impairments affect ADLs, work, recreational, exercise, transfer, ambulation, lift/carry, and sleep function that requires skilled PT intervention to resolve and enable patient to return to previous level of function. Factors that may affect progress in PT are chronic pain, obesity, medical co-morbidities,  work task strain, cognitive function, depression, and patient compliance. Patient is a flexion/stabilization,   extension/stabilization,  neutral stabilization program candidate.  Initial treatment of    Plan:       Current Problem:   1. Lumbar radiculopathy        2. Diffuse myofascial pain syndrome            Subjective    General:          Home Living:     Prior Level of Function:       Objective   Cognition:     Observation:  Brace    Hip joint clearance: PROM  Flexion  R  L ,  IR   R  L   ,   ER  R   L  ,  Extension  R    L    Single leg stance:  Eyes open  R  sec   L  sec    Lumbar AROM in standing:    Flex   %  fingers to  , Extension    %  ,   Sidebending  R  %  L  %  ,  BB with rotation  Peripheralizes       Centralizes    Myotomal Strength:  L3   R /5   L /5,  L4  R  /5  L  /5, L5 R  /5  L  /5,  S1   R  /5  L  /5    Hip Strength:  Abduction   R  /5  L  /5,   Extension  R   /5    L  /5 ,  Flexion R   /5   L  /5    Reflex: knee jerk   R  L  ,  Achilles  R  L     Sensation: Reduced / hypersensitive  in   xx dermatomal distribution  R  L      Accessory joint mobility: PA glide hypomobile   hypermobile   Pain    Lumbopelvic mobility: Side glide  R   L    restricted   cleared    SI joint: Gaenslen's maneuver   anterior innominate  R  L  posterior innominate  R  L   upslip  R  L downslip  R  L    Palpation: tenderness to  spinous process  iliac crest  PSIS R  L   ASIS R  L   pubic bones lateral sacral border  R    L    + trigger points    Gait: antalgic  Wbing  device    Special Tests:   squat strategy    knee dominant    hip hinge     Outcome Measures:      Treatments:      EDUCATION:     extensive education regarding mechanism of injury, relevant functional anatomy, treatment program rational, self management, HEP, and POC     Goals:

## 2025-02-17 DIAGNOSIS — N40.1 BENIGN PROSTATIC HYPERPLASIA WITH LOWER URINARY TRACT SYMPTOMS, SYMPTOM DETAILS UNSPECIFIED: ICD-10-CM

## 2025-02-17 DIAGNOSIS — I10 PRIMARY HYPERTENSION: ICD-10-CM

## 2025-02-17 RX ORDER — TAMSULOSIN HYDROCHLORIDE 0.4 MG/1
0.4 CAPSULE ORAL DAILY
Qty: 90 CAPSULE | Refills: 1 | Status: SHIPPED | OUTPATIENT
Start: 2025-02-17

## 2025-02-17 RX ORDER — AMLODIPINE BESYLATE 5 MG/1
5 TABLET ORAL DAILY
Qty: 90 TABLET | Refills: 1 | Status: SHIPPED | OUTPATIENT
Start: 2025-02-17

## 2025-03-07 ENCOUNTER — OFFICE VISIT (OUTPATIENT)
Dept: PRIMARY CARE | Facility: CLINIC | Age: 65
End: 2025-03-07
Payer: COMMERCIAL

## 2025-03-07 VITALS
WEIGHT: 205 LBS | HEIGHT: 69 IN | HEART RATE: 77 BPM | BODY MASS INDEX: 30.36 KG/M2 | OXYGEN SATURATION: 96 % | TEMPERATURE: 97.8 F | SYSTOLIC BLOOD PRESSURE: 128 MMHG | DIASTOLIC BLOOD PRESSURE: 82 MMHG

## 2025-03-07 DIAGNOSIS — H81.10 BENIGN PAROXYSMAL POSITIONAL VERTIGO, UNSPECIFIED LATERALITY: Primary | ICD-10-CM

## 2025-03-07 DIAGNOSIS — I10 PRIMARY HYPERTENSION: ICD-10-CM

## 2025-03-07 PROCEDURE — 3079F DIAST BP 80-89 MM HG: CPT | Performed by: INTERNAL MEDICINE

## 2025-03-07 PROCEDURE — 1126F AMNT PAIN NOTED NONE PRSNT: CPT | Performed by: INTERNAL MEDICINE

## 2025-03-07 PROCEDURE — 99214 OFFICE O/P EST MOD 30 MIN: CPT | Performed by: INTERNAL MEDICINE

## 2025-03-07 PROCEDURE — 3074F SYST BP LT 130 MM HG: CPT | Performed by: INTERNAL MEDICINE

## 2025-03-07 PROCEDURE — 1159F MED LIST DOCD IN RCRD: CPT | Performed by: INTERNAL MEDICINE

## 2025-03-07 PROCEDURE — 3008F BODY MASS INDEX DOCD: CPT | Performed by: INTERNAL MEDICINE

## 2025-03-07 RX ORDER — MECLIZINE HYDROCHLORIDE 25 MG/1
25 TABLET ORAL 3 TIMES DAILY PRN
Qty: 30 TABLET | Refills: 0 | Status: SHIPPED | OUTPATIENT
Start: 2025-03-07 | End: 2026-03-07

## 2025-03-07 ASSESSMENT — COLUMBIA-SUICIDE SEVERITY RATING SCALE - C-SSRS
1. IN THE PAST MONTH, HAVE YOU WISHED YOU WERE DEAD OR WISHED YOU COULD GO TO SLEEP AND NOT WAKE UP?: NO
2. HAVE YOU ACTUALLY HAD ANY THOUGHTS OF KILLING YOURSELF?: NO
6. HAVE YOU EVER DONE ANYTHING, STARTED TO DO ANYTHING, OR PREPARED TO DO ANYTHING TO END YOUR LIFE?: NO

## 2025-03-07 ASSESSMENT — ENCOUNTER SYMPTOMS
LOSS OF SENSATION IN FEET: 0
OCCASIONAL FEELINGS OF UNSTEADINESS: 0
DEPRESSION: 0

## 2025-03-07 ASSESSMENT — PAIN SCALES - GENERAL: PAINLEVEL_OUTOF10: 0-NO PAIN

## 2025-03-07 ASSESSMENT — PATIENT HEALTH QUESTIONNAIRE - PHQ9
SUM OF ALL RESPONSES TO PHQ9 QUESTIONS 1 AND 2: 0
2. FEELING DOWN, DEPRESSED OR HOPELESS: NOT AT ALL
1. LITTLE INTEREST OR PLEASURE IN DOING THINGS: NOT AT ALL

## 2025-03-07 NOTE — PROGRESS NOTES
"Subjective   Patient ID: Abiel Sorensen is a 64 y.o. male who presents for Dizziness/Cough (Possible vertigo 1 day).    HPI     Had an episode of dizziness yesterday when he tried to get up from sitting position, episodes on and off yesterday. No episodes since today morning  Had symptoms when he gets up from sitting position, no episodes when he was still  laying in bed  Has some runny nose, cough since last 2 days  Denied any fever, chills, shortness of breath, wheezing    Review of Systems   Constitutional:  Negative for fatigue and unexpected weight change.   HENT:  Negative for congestion, ear pain, sinus pain and sore throat.    Respiratory:  Negative for cough, shortness of breath and wheezing.    Cardiovascular:  Negative for chest pain, palpitations and leg swelling.   Gastrointestinal:  Negative for abdominal pain, blood in stool and constipation.   Genitourinary:  Negative for hematuria and urgency.   Musculoskeletal:  Positive for back pain. Negative for arthralgias and joint swelling.   Skin:  Negative for rash.   Neurological:  Positive for dizziness and weakness (right leg weakness). Negative for tremors, syncope and numbness.   Psychiatric/Behavioral:  Negative for behavioral problems. The patient is not nervous/anxious.        Objective   /82 (BP Location: Left arm, Patient Position: Sitting, BP Cuff Size: Large adult)   Pulse 77   Temp 36.6 °C (97.8 °F) (Temporal)   Ht 1.753 m (5' 9\")   Wt 93 kg (205 lb)   SpO2 96%   BMI 30.27 kg/m²     Physical Exam  Constitutional:       Appearance: Normal appearance.   HENT:      Head: Normocephalic and atraumatic.      Right Ear: Tympanic membrane normal.      Left Ear: Tympanic membrane normal.   Eyes:      Extraocular Movements: Extraocular movements intact.      Conjunctiva/sclera: Conjunctivae normal.   Cardiovascular:      Rate and Rhythm: Normal rate and regular rhythm.      Heart sounds: No murmur heard.  Pulmonary:      Effort: " Pulmonary effort is normal. No respiratory distress.      Breath sounds: Normal breath sounds.   Abdominal:      General: Abdomen is flat. Bowel sounds are normal.      Palpations: Abdomen is soft.   Musculoskeletal:      Right lower leg: No edema.      Left lower leg: No edema.   Lymphadenopathy:      Cervical: No cervical adenopathy.   Neurological:      Mental Status: He is alert and oriented to person, place, and time.      Cranial Nerves: No cranial nerve deficit.         Assessment/Plan        Abiel was seen today for dizziness/cough.  Diagnoses and all orders for this visit:  Benign paroxysmal positional vertigo, unspecified laterality (Primary)  -     meclizine (Antivert) 25 mg tablet; Take 1 tablet (25 mg) by mouth 3 times a day as needed for dizziness.  -     Referral to Physical Therapy; Future  -     Lipid Panel; Future  -     Lipid Panel  Primary hypertension  -     CBC and Auto Differential; Future  -     Comprehensive Metabolic Panel; Future  -     CBC and Auto Differential  -     Comprehensive Metabolic Panel    Symptoms likely due to vertigo, recommended Arrieta-Daroff exercises  Follow-up with physical therapy, follow-up in few weeks if no improvement in symptoms      Current Outpatient Medications   Medication Instructions    amLODIPine (NORVASC) 5 mg, oral, Daily    eszopiclone (LUNESTA) 3 mg, oral, Nightly, immediately before bedtime    ibuprofen 800 mg, 3 times daily PRN    meclizine (ANTIVERT) 25 mg, oral, 3 times daily PRN    naproxen (Naprosyn) 500 mg tablet TAKE 1 TABLET (500 MG) BY MOUTH EVERY 12 HOURS IF NEEDED FOR MILD PAIN (1 - 3).WITH FOOD OR MILK    tamsulosin (FLOMAX) 0.4 mg, oral, Daily    tiZANidine (ZANAFLEX) 2 mg, oral, 3 times daily PRN

## 2025-03-10 ASSESSMENT — ENCOUNTER SYMPTOMS
UNEXPECTED WEIGHT CHANGE: 0
CONSTIPATION: 0
SHORTNESS OF BREATH: 0
WEAKNESS: 1
PALPITATIONS: 0
FATIGUE: 0
TREMORS: 0
COUGH: 0
JOINT SWELLING: 0
ARTHRALGIAS: 0
HEMATURIA: 0
BLOOD IN STOOL: 0
BACK PAIN: 1
NUMBNESS: 0
WHEEZING: 0
DIZZINESS: 1
ABDOMINAL PAIN: 0
NERVOUS/ANXIOUS: 0
SORE THROAT: 0
SINUS PAIN: 0

## 2025-04-16 ENCOUNTER — OFFICE VISIT (OUTPATIENT)
Dept: PRIMARY CARE | Facility: CLINIC | Age: 65
End: 2025-04-16
Payer: COMMERCIAL

## 2025-04-16 VITALS
TEMPERATURE: 97.5 F | DIASTOLIC BLOOD PRESSURE: 80 MMHG | SYSTOLIC BLOOD PRESSURE: 138 MMHG | OXYGEN SATURATION: 95 % | WEIGHT: 207 LBS | HEIGHT: 69 IN | HEART RATE: 85 BPM | BODY MASS INDEX: 30.66 KG/M2

## 2025-04-16 DIAGNOSIS — G47.00 INSOMNIA, UNSPECIFIED TYPE: ICD-10-CM

## 2025-04-16 DIAGNOSIS — N40.1 BENIGN PROSTATIC HYPERPLASIA WITH LOWER URINARY TRACT SYMPTOMS, SYMPTOM DETAILS UNSPECIFIED: ICD-10-CM

## 2025-04-16 DIAGNOSIS — R41.3 MEMORY LOSS: ICD-10-CM

## 2025-04-16 DIAGNOSIS — G47.30 SLEEP APNEA IN ADULT: ICD-10-CM

## 2025-04-16 DIAGNOSIS — R35.0 FREQUENT URINATION: ICD-10-CM

## 2025-04-16 DIAGNOSIS — I10 PRIMARY HYPERTENSION: Primary | ICD-10-CM

## 2025-04-16 LAB
POC APPEARANCE, URINE: CLEAR
POC BILIRUBIN, URINE: NEGATIVE
POC BLOOD, URINE: NEGATIVE
POC COLOR, URINE: YELLOW
POC GLUCOSE, URINE: NEGATIVE MG/DL
POC KETONES, URINE: NEGATIVE MG/DL
POC LEUKOCYTES, URINE: ABNORMAL
POC NITRITE,URINE: NEGATIVE
POC PH, URINE: 6.5 PH
POC PROTEIN, URINE: ABNORMAL MG/DL
POC SPECIFIC GRAVITY, URINE: 1.01
POC UROBILINOGEN, URINE: 0.2 EU/DL

## 2025-04-16 PROCEDURE — 3079F DIAST BP 80-89 MM HG: CPT | Performed by: INTERNAL MEDICINE

## 2025-04-16 PROCEDURE — 81002 URINALYSIS NONAUTO W/O SCOPE: CPT | Performed by: INTERNAL MEDICINE

## 2025-04-16 PROCEDURE — 3008F BODY MASS INDEX DOCD: CPT | Performed by: INTERNAL MEDICINE

## 2025-04-16 PROCEDURE — 99214 OFFICE O/P EST MOD 30 MIN: CPT | Performed by: INTERNAL MEDICINE

## 2025-04-16 PROCEDURE — 3075F SYST BP GE 130 - 139MM HG: CPT | Performed by: INTERNAL MEDICINE

## 2025-04-16 PROCEDURE — 1036F TOBACCO NON-USER: CPT | Performed by: INTERNAL MEDICINE

## 2025-04-16 PROCEDURE — 90677 PCV20 VACCINE IM: CPT | Performed by: INTERNAL MEDICINE

## 2025-04-16 PROCEDURE — 1126F AMNT PAIN NOTED NONE PRSNT: CPT | Performed by: INTERNAL MEDICINE

## 2025-04-16 PROCEDURE — 1159F MED LIST DOCD IN RCRD: CPT | Performed by: INTERNAL MEDICINE

## 2025-04-16 PROCEDURE — 90471 IMMUNIZATION ADMIN: CPT | Performed by: INTERNAL MEDICINE

## 2025-04-16 RX ORDER — ESZOPICLONE 3 MG/1
3 TABLET, FILM COATED ORAL NIGHTLY
Qty: 90 TABLET | Refills: 0 | Status: SHIPPED | OUTPATIENT
Start: 2025-04-16 | End: 2025-07-15

## 2025-04-16 ASSESSMENT — ENCOUNTER SYMPTOMS
COUGH: 0
ARTHRALGIAS: 0
SINUS PAIN: 0
FATIGUE: 0
OCCASIONAL FEELINGS OF UNSTEADINESS: 0
TREMORS: 0
PALPITATIONS: 0
JOINT SWELLING: 0
HEMATURIA: 0
ABDOMINAL PAIN: 0
NERVOUS/ANXIOUS: 0
DEPRESSION: 0
LOSS OF SENSATION IN FEET: 0
UNEXPECTED WEIGHT CHANGE: 0
SORE THROAT: 0
WHEEZING: 0
NUMBNESS: 0
BLOOD IN STOOL: 0
CONSTIPATION: 0
BACK PAIN: 0
WEAKNESS: 1
SHORTNESS OF BREATH: 0

## 2025-04-16 ASSESSMENT — COLUMBIA-SUICIDE SEVERITY RATING SCALE - C-SSRS
6. HAVE YOU EVER DONE ANYTHING, STARTED TO DO ANYTHING, OR PREPARED TO DO ANYTHING TO END YOUR LIFE?: NO
2. HAVE YOU ACTUALLY HAD ANY THOUGHTS OF KILLING YOURSELF?: NO
1. IN THE PAST MONTH, HAVE YOU WISHED YOU WERE DEAD OR WISHED YOU COULD GO TO SLEEP AND NOT WAKE UP?: NO

## 2025-04-16 ASSESSMENT — PATIENT HEALTH QUESTIONNAIRE - PHQ9
1. LITTLE INTEREST OR PLEASURE IN DOING THINGS: NOT AT ALL
SUM OF ALL RESPONSES TO PHQ9 QUESTIONS 1 AND 2: 0
2. FEELING DOWN, DEPRESSED OR HOPELESS: NOT AT ALL

## 2025-04-16 ASSESSMENT — PAIN SCALES - GENERAL: PAINLEVEL_OUTOF10: 0-NO PAIN

## 2025-04-16 NOTE — PROGRESS NOTES
Subjective   Patient ID: Abiel Sorensen is a 64 y.o. male who presents for Urinary Frequency.    HPI          Has history of hypertension, BPH, insomnia.                 H/O BPH and elevated PSA- sees Dr Moon.         H/O Hypertension, compliant with medications.     Complaining of right gluteal pain radiating down right leg, with right leg weakness since last 3 months. He is doing exercises at home, which helped a little. Taking Naproxen helps.  Had MRI of lumbar spine in 2020 which showed      Grade 1 anterolisthesis of the L5 5 in relation to the L4 and S1  vertebra. Degenerative changes and disc disease. Left paracentral disc  herniation at the L3-L4 level, contacting the traversing left L4 nerve root  within the lateral recess. Bilateral L5 foraminal stenosis, with impingement  of the exiting L5 nerve roots bilaterally.     He saw [facility] for few months, which helped his symptoms several years ago    MRI lumbar spine was denied by insurance in May 2024, taking Naproxen and Tizanidine as needed, still no improvement. Will refer to PT, advised MRI  if no improvement in symptoms after PT  Back pain better since last visit    Increased urinary frequency, nocturia 3-4 times at night      Complaining of difficulty falling asleep, taking lunesta at night as needed. Falling asleep easily during day time    H/O PETE, sleep study was more than 10 years ago, couldn't tolerate CPAP machine    Taking Prevagen for memory issues for past 2 years, it helepd initially    Review of Systems   Constitutional:  Negative for fatigue and unexpected weight change.   HENT:  Negative for congestion, ear pain, sinus pain and sore throat.    Respiratory:  Negative for cough, shortness of breath and wheezing.    Cardiovascular:  Negative for chest pain, palpitations and leg swelling.   Gastrointestinal:  Negative for abdominal pain, blood in stool and constipation.   Genitourinary:  Negative for hematuria and urgency.  "  Musculoskeletal:  Negative for arthralgias, back pain and joint swelling.   Skin:  Negative for rash.   Neurological:  Positive for weakness (right leg weakness). Negative for tremors, syncope and numbness.   Psychiatric/Behavioral:  Negative for behavioral problems. The patient is not nervous/anxious.        Objective   /80 (BP Location: Left arm, Patient Position: Sitting, BP Cuff Size: Large adult)   Pulse 85   Temp 36.4 °C (97.5 °F) (Temporal)   Ht 1.753 m (5' 9\")   Wt 93.9 kg (207 lb)   SpO2 95%   BMI 30.57 kg/m²     Physical Exam  Constitutional:       General: He is not in acute distress.  HENT:      Head: Normocephalic and atraumatic.   Eyes:      Extraocular Movements: Extraocular movements intact.      Conjunctiva/sclera: Conjunctivae normal.   Cardiovascular:      Rate and Rhythm: Normal rate and regular rhythm.      Pulses: Normal pulses.      Heart sounds: No murmur heard.  Pulmonary:      Effort: Pulmonary effort is normal.      Breath sounds: Normal breath sounds. No wheezing or rales.   Abdominal:      General: Bowel sounds are normal.      Palpations: Abdomen is soft. There is no mass.      Tenderness: There is no abdominal tenderness. There is no guarding.   Musculoskeletal:         General: Normal range of motion.      Right lower leg: No edema.      Left lower leg: No edema.   Neurological:      General: No focal deficit present.      Mental Status: He is alert and oriented to person, place, and time.      Cranial Nerves: No cranial nerve deficit.   Psychiatric:         Mood and Affect: Mood normal.         Assessment/Plan       Abiel was seen today for urinary frequency.  Diagnoses and all orders for this visit:  Primary hypertension (Primary)  Frequent urination  -     POCT UA (nonautomated) manually resulted  Sleep apnea in adult  -     Referral to Adult Sleep Medicine; Future  Insomnia, unspecified type  -     eszopiclone (Lunesta) 3 mg tablet; Take 1 tablet (3 mg) by mouth " once daily at bedtime. immediately before bedtime  Benign prostatic hyperplasia with lower urinary tract symptoms, symptom details unspecified  Memory loss  -     Referral to Neurology; Future  Other orders  -     Pneumococcal conjugate vaccine, 20-valent (PREVNAR 20)       Advised to reduce daily caffeine intake, which is making his urinary frequency worse  PSA elevated, has been following with urology     Taking prevagen for mental fogginess    Lab Results   Component Value Date    GLUCOSE 86 04/01/2024    CALCIUM 9.5 04/01/2024     04/01/2024    K 4.5 04/01/2024    CO2 30 04/01/2024     04/01/2024    BUN 14 04/01/2024    CREATININE 1.21 04/01/2024        Lab Results   Component Value Date    WBC 5.7 04/01/2024    HGB 14.6 04/01/2024    HCT 47.1 04/01/2024    MCV 90 04/01/2024     04/01/2024    .  Lab Results   Component Value Date    PSA 3.8 05/10/2023    PSA 8.9 (H) 10/06/2022    PSA 4.7 (H) 02/08/2021        Current Outpatient Medications   Medication Instructions    amLODIPine (NORVASC) 5 mg, oral, Daily    eszopiclone (LUNESTA) 3 mg, oral, Nightly, immediately before bedtime    ibuprofen 800 mg, 3 times daily PRN    meclizine (ANTIVERT) 25 mg, oral, 3 times daily PRN    naproxen (Naprosyn) 500 mg tablet TAKE 1 TABLET (500 MG) BY MOUTH EVERY 12 HOURS IF NEEDED FOR MILD PAIN (1 - 3).WITH FOOD OR MILK    tamsulosin (FLOMAX) 0.4 mg, oral, Daily    tiZANidine (ZANAFLEX) 2 mg, oral, 3 times daily PRN

## 2025-08-11 DIAGNOSIS — I10 PRIMARY HYPERTENSION: ICD-10-CM

## 2025-08-12 RX ORDER — AMLODIPINE BESYLATE 5 MG/1
5 TABLET ORAL DAILY
Qty: 90 TABLET | Refills: 1 | Status: SHIPPED | OUTPATIENT
Start: 2025-08-12

## 2025-08-30 DIAGNOSIS — G47.00 INSOMNIA, UNSPECIFIED TYPE: ICD-10-CM

## 2025-09-02 RX ORDER — ESZOPICLONE 3 MG/1
3 TABLET, FILM COATED ORAL NIGHTLY
Qty: 90 TABLET | Refills: 0 | Status: SHIPPED | OUTPATIENT
Start: 2025-09-02 | End: 2025-12-01